# Patient Record
Sex: MALE | Race: WHITE | URBAN - METROPOLITAN AREA
[De-identification: names, ages, dates, MRNs, and addresses within clinical notes are randomized per-mention and may not be internally consistent; named-entity substitution may affect disease eponyms.]

---

## 2021-11-24 ENCOUNTER — OFFICE VISIT (OUTPATIENT)
Dept: URBAN - METROPOLITAN AREA CLINIC 80 | Facility: CLINIC | Age: 31
End: 2021-11-24

## 2021-12-01 ENCOUNTER — WEB ENCOUNTER (OUTPATIENT)
Dept: URBAN - METROPOLITAN AREA CLINIC 80 | Facility: CLINIC | Age: 31
End: 2021-12-01

## 2021-12-01 ENCOUNTER — LAB OUTSIDE AN ENCOUNTER (OUTPATIENT)
Dept: URBAN - METROPOLITAN AREA CLINIC 80 | Facility: CLINIC | Age: 31
End: 2021-12-01

## 2021-12-01 ENCOUNTER — OFFICE VISIT (OUTPATIENT)
Dept: URBAN - METROPOLITAN AREA CLINIC 80 | Facility: CLINIC | Age: 31
End: 2021-12-01
Payer: COMMERCIAL

## 2021-12-01 ENCOUNTER — DASHBOARD ENCOUNTERS (OUTPATIENT)
Age: 31
End: 2021-12-01

## 2021-12-01 DIAGNOSIS — K59.09 CHRONIC CONSTIPATION: ICD-10-CM

## 2021-12-01 DIAGNOSIS — K57.92 DIVERTICULITIS: ICD-10-CM

## 2021-12-01 PROCEDURE — 99204 OFFICE O/P NEW MOD 45 MIN: CPT | Performed by: PHYSICIAN ASSISTANT

## 2021-12-01 RX ORDER — POLYETHYLENE GLYCOL 3350, SODIUM SULFATE, SODIUM CHLORIDE, POTASSIUM CHLORIDE, ASCORBIC ACID, SODIUM ASCORBATE 140-9-5.2G
AS DIRECTED KIT ORAL 1
Qty: 1 | Refills: 0 | OUTPATIENT
Start: 2021-12-01 | End: 2021-12-02

## 2021-12-02 PROBLEM — 307496006: Status: ACTIVE | Noted: 2021-12-01

## 2021-12-04 ENCOUNTER — CLAIMS CREATED FROM THE CLAIM WINDOW (OUTPATIENT)
Dept: URBAN - METROPOLITAN AREA CLINIC 4 | Facility: CLINIC | Age: 31
End: 2021-12-04
Payer: COMMERCIAL

## 2021-12-04 ENCOUNTER — OFFICE VISIT (OUTPATIENT)
Dept: URBAN - METROPOLITAN AREA SURGERY CENTER 19 | Facility: SURGERY CENTER | Age: 31
End: 2021-12-04
Payer: COMMERCIAL

## 2021-12-04 DIAGNOSIS — K63.5 BENIGN COLON POLYP: ICD-10-CM

## 2021-12-04 DIAGNOSIS — R10.84 ABDOMINAL CRAMPING, GENERALIZED: ICD-10-CM

## 2021-12-04 DIAGNOSIS — K63.89 POLYP, HYPERPLASTIC: ICD-10-CM

## 2021-12-04 DIAGNOSIS — K59.09 CHANGE IN BOWEL MOVEMENTS INTERMITTENT CONSTIPATION. URGENCY IN THE MORNING.: ICD-10-CM

## 2021-12-04 PROCEDURE — G8907 PT DOC NO EVENTS ON DISCHARG: HCPCS | Performed by: INTERNAL MEDICINE

## 2021-12-04 PROCEDURE — 88305 TISSUE EXAM BY PATHOLOGIST: CPT | Performed by: PATHOLOGY

## 2021-12-04 PROCEDURE — 45380 COLONOSCOPY AND BIOPSY: CPT | Performed by: INTERNAL MEDICINE

## 2021-12-06 ENCOUNTER — TELEPHONE ENCOUNTER (OUTPATIENT)
Dept: URBAN - METROPOLITAN AREA CLINIC 80 | Facility: CLINIC | Age: 31
End: 2021-12-06

## 2023-04-30 NOTE — HPI-TODAY'S VISIT:
He was diagnosed with diverticulitis in October - he was treated with Cipro and Flagyl for 7 days - he then went back to ER around day 6 and ct showed mild changes of diverticulitis - gave him Bentyl and had him finish the last dose of medicine.  He feels like his symptoms come and go now - confused about what he can and cannot eat Trying to keep it bland, drinking a lot of water He had what felt like a flare up a week ago but since then no significant discomfort - He felt the Bentyl made him more constipated He has been taking Metamucil lately and that seems to help Normal bowel habit is 1 BM q2-3 days and then he would spend a lot of time passing the stool and then go a few days again - no BRBPR or melena On the Metamucil he felt his bowels were more regular at first but now back to going every 2-3 days No family hx IBD, colon ca or polyps Maternal grandfather, mom and brother have all had diverticulitis normal...
